# Patient Record
Sex: FEMALE | Race: WHITE | Employment: FULL TIME | URBAN - METROPOLITAN AREA
[De-identification: names, ages, dates, MRNs, and addresses within clinical notes are randomized per-mention and may not be internally consistent; named-entity substitution may affect disease eponyms.]

---

## 2017-10-04 ENCOUNTER — HOSPITAL ENCOUNTER (EMERGENCY)
Age: 42
Discharge: LEFT BEFORE DISCHARGE | End: 2017-10-04
Attending: EMERGENCY MEDICINE
Payer: SELF-PAY

## 2017-10-04 VITALS
DIASTOLIC BLOOD PRESSURE: 68 MMHG | BODY MASS INDEX: 20.46 KG/M2 | WEIGHT: 135 LBS | OXYGEN SATURATION: 99 % | SYSTOLIC BLOOD PRESSURE: 120 MMHG | TEMPERATURE: 98.3 F | HEIGHT: 68 IN | HEART RATE: 87 BPM | RESPIRATION RATE: 16 BRPM

## 2017-10-04 LAB
APPEARANCE UR: CLEAR
BACTERIA URNS QL MICRO: ABNORMAL /HPF
BILIRUB UR QL: NEGATIVE
COLOR UR: YELLOW
EPITH CASTS URNS QL MICRO: ABNORMAL /LPF (ref 0–5)
GLUCOSE UR STRIP.AUTO-MCNC: NEGATIVE MG/DL
HGB UR QL STRIP: NEGATIVE
KETONES UR QL STRIP.AUTO: NEGATIVE MG/DL
LEUKOCYTE ESTERASE UR QL STRIP.AUTO: ABNORMAL
NITRITE UR QL STRIP.AUTO: NEGATIVE
PH UR STRIP: 6.5 [PH] (ref 5–8)
PROT UR STRIP-MCNC: NEGATIVE MG/DL
RBC #/AREA URNS HPF: ABNORMAL /HPF (ref 0–5)
SP GR UR REFRACTOMETRY: >1.03 (ref 1–1.03)
UROBILINOGEN UR QL STRIP.AUTO: 1 EU/DL (ref 0.2–1)
WBC URNS QL MICRO: ABNORMAL /HPF (ref 0–5)

## 2017-10-04 PROCEDURE — 75810000275 HC EMERGENCY DEPT VISIT NO LEVEL OF CARE

## 2017-10-04 PROCEDURE — 81001 URINALYSIS AUTO W/SCOPE: CPT | Performed by: EMERGENCY MEDICINE

## 2017-10-05 NOTE — CALL BACK NOTE
Patient was called this AM as she left yesterday without being seen due to the wait time. Patient requesting to know the results of her urine sample. The patient was updated on results. Patients abdominal pain has improved but she is still experiencing urinary urgency. Patient was encouraged to return to the ED for further evaluation or follow up with PCP. Patient offering no questions or concerns at this time.

## 2017-12-10 ENCOUNTER — HOSPITAL ENCOUNTER (EMERGENCY)
Age: 42
Discharge: HOME | End: 2017-12-10
Attending: EMERGENCY MEDICINE
Payer: COMMERCIAL

## 2017-12-10 ENCOUNTER — APPOINTMENT (OUTPATIENT)
Dept: CT IMAGING | Age: 42
End: 2017-12-10
Attending: EMERGENCY MEDICINE
Payer: COMMERCIAL

## 2017-12-10 ENCOUNTER — APPOINTMENT (OUTPATIENT)
Dept: GENERAL RADIOLOGY | Age: 42
End: 2017-12-10
Attending: EMERGENCY MEDICINE
Payer: COMMERCIAL

## 2017-12-10 VITALS
HEIGHT: 68 IN | SYSTOLIC BLOOD PRESSURE: 149 MMHG | RESPIRATION RATE: 16 BRPM | TEMPERATURE: 98.9 F | WEIGHT: 180 LBS | OXYGEN SATURATION: 100 % | HEART RATE: 101 BPM | BODY MASS INDEX: 27.28 KG/M2 | DIASTOLIC BLOOD PRESSURE: 100 MMHG

## 2017-12-10 DIAGNOSIS — S56.912A STRAIN OF UNSPECIFIED MUSCLES, FASCIA AND TENDONS AT FOREARM LEVEL, LEFT ARM, INITIAL ENCOUNTER: ICD-10-CM

## 2017-12-10 DIAGNOSIS — Z53.29 REFUSAL OF CARE BY PATIENT: ICD-10-CM

## 2017-12-10 DIAGNOSIS — S43.402A SPRAIN OF LEFT SHOULDER, INITIAL ENCOUNTER: ICD-10-CM

## 2017-12-10 DIAGNOSIS — S60.00XA CONTUSION OF LEFT HAND INCLUDING FINGERS, INITIAL ENCOUNTER: ICD-10-CM

## 2017-12-10 DIAGNOSIS — S16.1XXA CERVICAL STRAIN, ACUTE, INITIAL ENCOUNTER: ICD-10-CM

## 2017-12-10 DIAGNOSIS — S00.83XA FACIAL CONTUSION, INITIAL ENCOUNTER: Primary | ICD-10-CM

## 2017-12-10 DIAGNOSIS — S60.222A CONTUSION OF LEFT HAND INCLUDING FINGERS, INITIAL ENCOUNTER: ICD-10-CM

## 2017-12-10 DIAGNOSIS — S12.500A CLOSED DISPLACED FRACTURE OF SIXTH CERVICAL VERTEBRA, UNSPECIFIED FRACTURE MORPHOLOGY, INITIAL ENCOUNTER (HCC): ICD-10-CM

## 2017-12-10 PROCEDURE — 73090 X-RAY EXAM OF FOREARM: CPT

## 2017-12-10 PROCEDURE — 73130 X-RAY EXAM OF HAND: CPT

## 2017-12-10 PROCEDURE — 73060 X-RAY EXAM OF HUMERUS: CPT

## 2017-12-10 PROCEDURE — 74011250637 HC RX REV CODE- 250/637: Performed by: EMERGENCY MEDICINE

## 2017-12-10 PROCEDURE — 99282 EMERGENCY DEPT VISIT SF MDM: CPT

## 2017-12-10 PROCEDURE — 73030 X-RAY EXAM OF SHOULDER: CPT

## 2017-12-10 PROCEDURE — 72125 CT NECK SPINE W/O DYE: CPT

## 2017-12-10 PROCEDURE — L0172 CERV COL SR FOAM 2PC PRE OTS: HCPCS

## 2017-12-10 PROCEDURE — 72040 X-RAY EXAM NECK SPINE 2-3 VW: CPT

## 2017-12-10 PROCEDURE — 71020 XR CHEST PA LAT: CPT

## 2017-12-10 PROCEDURE — L0160 CERV SR WIRE OCC/MAN PRE OTS: HCPCS

## 2017-12-10 RX ORDER — IBUPROFEN 800 MG/1
800 TABLET ORAL
Qty: 15 TAB | Refills: 0 | Status: SHIPPED | OUTPATIENT
Start: 2017-12-10 | End: 2017-12-17

## 2017-12-10 RX ORDER — IBUPROFEN 600 MG/1
600 TABLET ORAL
Status: COMPLETED | OUTPATIENT
Start: 2017-12-10 | End: 2017-12-10

## 2017-12-10 RX ORDER — CYCLOBENZAPRINE HCL 10 MG
10 TABLET ORAL
Qty: 15 TAB | Refills: 0 | Status: SHIPPED | OUTPATIENT
Start: 2017-12-10 | End: 2017-12-15

## 2017-12-10 RX ORDER — HYDROCODONE BITARTRATE AND ACETAMINOPHEN 5; 325 MG/1; MG/1
1 TABLET ORAL
Qty: 12 TAB | Refills: 0 | Status: SHIPPED | OUTPATIENT
Start: 2017-12-10 | End: 2017-12-13

## 2017-12-10 RX ADMIN — IBUPROFEN 600 MG: 600 TABLET, FILM COATED ORAL at 14:02

## 2017-12-10 NOTE — ED NOTES
Pt out to desk multiple times to speak with Dr Prema Young;  Pt states that she needs to leave, states her ride is here, does not want to wait for any further testing or for instructions regarding treatment and follow up;  See notes by Dr Prema Young.

## 2017-12-10 NOTE — ED NOTES
Pt left before receiving discharge instructions or follow up information; pt stated to Dr Torin Frausto that she would call back and speak with him, stated she had to leave because her ride was leaving and she would have no other way to get home;
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
## 2017-12-10 NOTE — ED NOTES
Pt left before receiving discharge instructions or prescriptions  Spoke with Dr Tiara Armstrong via telephone;  Pt verbalized that she will return to speak with Dr Tiara Armstrong and receiving prescriptions and instructions;
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
## 2017-12-10 NOTE — LETTER
[facility] [place] 
THE [facility] EMERGENCY DEPT 
[address] [address] 
[phone] Work/School Note Date: 12/10/2017 To Whom It May concern: 
 
Victorino Barrientos was seen and treated today in the emergency room by the following provider(s): 
No providers found.    
 
Victorino Barrientos may not return to work until cleared by the orthopedic specialist. 
 
Sincerely, 
 
 
 
 
Victor Hugo Montejo MD

## 2017-12-11 NOTE — ED NOTES
Pt returned to the ED to receive discharge instructions and prescriptions from Dr Truman Mart;  Joaquín Chapman 3 in family;  [place] collar previously placed on patient was not being worn by patient; Pt states \"it's not comfortable\"  Pt again instructed about the importance of wearing the collar, advised by Dr Truman Mart that the orthopedist/spine specialist recommended the collar be worn, and also explained to patient her injury and how the fracture is possibly causing the radicular pain by Dr Truman Mart;  Pt again verbalized understanding and states \"I will try to wear the collor, I will do my best\"  Pt discharged by Dr Truman Mart and follow up instructions reiterated by Dr Truman Mart repeatedly; pt again verbalized her understanding.

## 2018-03-02 PROBLEM — M40.202 CERVICAL KYPHOSIS: Status: ACTIVE | Noted: 2018-03-02

## 2018-03-02 PROBLEM — M50.30 DDD (DEGENERATIVE DISC DISEASE), CERVICAL: Status: ACTIVE | Noted: 2018-03-02

## 2018-03-02 PROBLEM — S12.500A C6 CERVICAL FRACTURE (HCC): Status: ACTIVE | Noted: 2018-03-02

## 2018-04-25 ENCOUNTER — APPOINTMENT (OUTPATIENT)
Dept: CT IMAGING | Age: 43
End: 2018-04-25
Attending: INTERNAL MEDICINE
Payer: COMMERCIAL

## 2018-04-25 ENCOUNTER — HOSPITAL ENCOUNTER (EMERGENCY)
Age: 43
Discharge: HOME | End: 2018-04-25
Attending: INTERNAL MEDICINE | Admitting: INTERNAL MEDICINE
Payer: COMMERCIAL

## 2018-04-25 VITALS
DIASTOLIC BLOOD PRESSURE: 78 MMHG | BODY MASS INDEX: 28.04 KG/M2 | HEART RATE: 86 BPM | OXYGEN SATURATION: 100 % | RESPIRATION RATE: 18 BRPM | WEIGHT: 185 LBS | HEIGHT: 68 IN | SYSTOLIC BLOOD PRESSURE: 128 MMHG | TEMPERATURE: 98.2 F

## 2018-04-25 DIAGNOSIS — N20.1 URETERAL STONE: ICD-10-CM

## 2018-04-25 DIAGNOSIS — R10.9 FLANK PAIN: Primary | ICD-10-CM

## 2018-04-25 DIAGNOSIS — K80.50 CALCULUS OF BILE DUCT WITHOUT CHOLANGITIS OR BILIARY OBSTRUCTION: ICD-10-CM

## 2018-04-25 LAB
ALBUMIN SERPL-MCNC: 3.7 G/DL (ref 3.4–5)
ALBUMIN/GLOB SERPL: 1.1 {RATIO} (ref 0.8–1.7)
ALP SERPL-CCNC: 61 U/L (ref 45–117)
ALT SERPL-CCNC: 33 U/L (ref 13–56)
ANION GAP SERPL CALC-SCNC: 5 MMOL/L (ref 3–18)
APPEARANCE UR: CLEAR
AST SERPL-CCNC: 19 U/L (ref 15–37)
BACTERIA URNS QL MICRO: ABNORMAL /HPF
BASOPHILS # BLD: 0 K/UL (ref 0–0.06)
BASOPHILS NFR BLD: 1 % (ref 0–2)
BILIRUB SERPL-MCNC: 0.5 MG/DL (ref 0.2–1)
BILIRUB UR QL: NEGATIVE
BUN SERPL-MCNC: 14 MG/DL (ref 7–18)
BUN/CREAT SERPL: 15 (ref 12–20)
CALCIUM SERPL-MCNC: 8.8 MG/DL (ref 8.5–10.1)
CHLORIDE SERPL-SCNC: 104 MMOL/L (ref 100–108)
CO2 SERPL-SCNC: 27 MMOL/L (ref 21–32)
COLOR UR: YELLOW
CREAT SERPL-MCNC: 0.91 MG/DL (ref 0.6–1.3)
DIFFERENTIAL METHOD BLD: ABNORMAL
EOSINOPHIL # BLD: 0.2 K/UL (ref 0–0.4)
EOSINOPHIL NFR BLD: 3 % (ref 0–5)
EPITH CASTS URNS QL MICRO: ABNORMAL /LPF (ref 0–5)
ERYTHROCYTE [DISTWIDTH] IN BLOOD BY AUTOMATED COUNT: 12.2 % (ref 11.6–14.5)
GLOBULIN SER CALC-MCNC: 3.4 G/DL (ref 2–4)
GLUCOSE SERPL-MCNC: 104 MG/DL (ref 74–99)
GLUCOSE UR STRIP.AUTO-MCNC: NEGATIVE MG/DL
HCG UR QL: NEGATIVE
HCT VFR BLD AUTO: 41.4 % (ref 35–45)
HGB BLD-MCNC: 14 G/DL (ref 12–16)
HGB UR QL STRIP: NEGATIVE
HYALINE CASTS URNS QL MICRO: ABNORMAL /LPF (ref 0–2)
KETONES UR QL STRIP.AUTO: NEGATIVE MG/DL
LEUKOCYTE ESTERASE UR QL STRIP.AUTO: ABNORMAL
LYMPHOCYTES # BLD: 2.2 K/UL (ref 0.9–3.6)
LYMPHOCYTES NFR BLD: 28 % (ref 21–52)
MCH RBC QN AUTO: 33.1 PG (ref 24–34)
MCHC RBC AUTO-ENTMCNC: 33.8 G/DL (ref 31–37)
MCV RBC AUTO: 97.9 FL (ref 74–97)
MONOCYTES # BLD: 0.6 K/UL (ref 0.05–1.2)
MONOCYTES NFR BLD: 8 % (ref 3–10)
NEUTS SEG # BLD: 4.9 K/UL (ref 1.8–8)
NEUTS SEG NFR BLD: 60 % (ref 40–73)
NITRITE UR QL STRIP.AUTO: NEGATIVE
PH UR STRIP: 6 [PH] (ref 5–8)
PLATELET # BLD AUTO: 235 K/UL (ref 135–420)
PMV BLD AUTO: 9.5 FL (ref 9.2–11.8)
POTASSIUM SERPL-SCNC: 3.7 MMOL/L (ref 3.5–5.5)
PROT SERPL-MCNC: 7.1 G/DL (ref 6.4–8.2)
PROT UR STRIP-MCNC: NEGATIVE MG/DL
RBC # BLD AUTO: 4.23 M/UL (ref 4.2–5.3)
RBC #/AREA URNS HPF: ABNORMAL /HPF (ref 0–5)
SODIUM SERPL-SCNC: 136 MMOL/L (ref 136–145)
SP GR UR REFRACTOMETRY: 1.02 (ref 1–1.03)
UROBILINOGEN UR QL STRIP.AUTO: 0.2 EU/DL (ref 0.2–1)
WBC # BLD AUTO: 8 K/UL (ref 4.6–13.2)
WBC URNS QL MICRO: ABNORMAL /HPF (ref 0–5)

## 2018-04-25 PROCEDURE — 87086 URINE CULTURE/COLONY COUNT: CPT | Performed by: INTERNAL MEDICINE

## 2018-04-25 PROCEDURE — 81001 URINALYSIS AUTO W/SCOPE: CPT | Performed by: INTERNAL MEDICINE

## 2018-04-25 PROCEDURE — 81025 URINE PREGNANCY TEST: CPT

## 2018-04-25 PROCEDURE — 87186 SC STD MICRODIL/AGAR DIL: CPT | Performed by: INTERNAL MEDICINE

## 2018-04-25 PROCEDURE — 85025 COMPLETE CBC W/AUTO DIFF WBC: CPT | Performed by: INTERNAL MEDICINE

## 2018-04-25 PROCEDURE — 87077 CULTURE AEROBIC IDENTIFY: CPT | Performed by: INTERNAL MEDICINE

## 2018-04-25 PROCEDURE — 99284 EMERGENCY DEPT VISIT MOD MDM: CPT

## 2018-04-25 PROCEDURE — 74176 CT ABD & PELVIS W/O CONTRAST: CPT

## 2018-04-25 PROCEDURE — 74011250636 HC RX REV CODE- 250/636: Performed by: INTERNAL MEDICINE

## 2018-04-25 PROCEDURE — 80053 COMPREHEN METABOLIC PANEL: CPT | Performed by: INTERNAL MEDICINE

## 2018-04-25 PROCEDURE — 96374 THER/PROPH/DIAG INJ IV PUSH: CPT

## 2018-04-25 PROCEDURE — 96361 HYDRATE IV INFUSION ADD-ON: CPT

## 2018-04-25 RX ORDER — NITROFURANTOIN (MACROCRYSTALS) 100 MG/1
100 CAPSULE ORAL 2 TIMES DAILY
Qty: 14 CAP | Refills: 0 | Status: SHIPPED | OUTPATIENT
Start: 2018-04-25 | End: 2018-05-02

## 2018-04-25 RX ORDER — IBUPROFEN 600 MG/1
600 TABLET ORAL
Qty: 20 TAB | Refills: 0 | Status: SHIPPED | OUTPATIENT
Start: 2018-04-25

## 2018-04-25 RX ORDER — KETOROLAC TROMETHAMINE 30 MG/ML
30 INJECTION, SOLUTION INTRAMUSCULAR; INTRAVENOUS
Status: COMPLETED | OUTPATIENT
Start: 2018-04-25 | End: 2018-04-25

## 2018-04-25 RX ORDER — ACETAMINOPHEN AND CODEINE PHOSPHATE 300; 30 MG/1; MG/1
1 TABLET ORAL
Qty: 12 TAB | Refills: 0 | Status: SHIPPED | OUTPATIENT
Start: 2018-04-25 | End: 2018-10-17

## 2018-04-25 RX ADMIN — SODIUM CHLORIDE 1000 ML: 900 INJECTION, SOLUTION INTRAVENOUS at 08:25

## 2018-04-25 RX ADMIN — KETOROLAC TROMETHAMINE 30 MG: 30 INJECTION, SOLUTION INTRAMUSCULAR at 08:48

## 2018-04-25 NOTE — ED TRIAGE NOTES
Patient with c/o RIGHT flank pain, dysuria, hematuria. Patient was seen for same complaint on Monday at a clinic in [facility]. Reports KUB done. Patient reports passing kidney stone with strainer. Patient woke up this AM with \"stabbing\" pain. Patient reports taking Tylenol and ibuprofen, last admin PTA. Sepsis Screening completed    (  )Patient meets SIRS criteria. ( x )Patient does not meet SIRS criteria.       SIRS Criteria is achieved when two or more of the following are present   Temperature < 96.8°F (36°C) or > 100.9°F (38.3°C)   Heart Rate > 90 beats per minute   Respiratory Rate > 20 breaths per minute   WBC count > 12,000 or <4,000 or > 10% bands

## 2018-04-25 NOTE — ED NOTES
Patient tolerated rx Toradol IVP, see [facility]    Discharge instructions reviewed with the patient with opportunity for questions given. The patient verbalized understanding. Patient armband removed and shredded. Patient's VSS and in stable condition at time of discharge.

## 2018-04-25 NOTE — DISCHARGE INSTRUCTIONS

## 2018-04-25 NOTE — LETTER
[facility] [place] 
THE [facility] EMERGENCY DEPT 
[address] [id number] [phone] Work/School Note Date: 4/27/18 To Whom It May concern: 
 
Puja Gabriel Please return call to the ED for results from your visit. Sincerely, Paresh Villatoro NP

## 2018-04-25 NOTE — ED PROVIDER NOTES
EMERGENCY DEPARTMENT HISTORY AND PHYSICAL EXAM    Date: 4/25/2018  Patient Name: Stanford Hodge    History of Presenting Illness     Chief Complaint   Patient presents with    Flank Pain     RIGHT         History Provided By: Patient    Chief Complaint: right flank pain  Duration: this morning, waking pt from sleep   Timing:  Acute and intermittent  Location: right flank radiating to abd and back  Quality: Stabbing  Severity: 2 out of 10 currently, but 10/10 when pain occurs. Associated Symptoms: dysuria, nausea, chills, back pain, abd pain, and hematuria. Additional History (Context):   7:37 AM  Stanford Hodge is a 37 y.o. female with PMHX of migraines who presents to the emergency department C/O intermittent 2 to 10/10 stabbing right flank pain radiating to abdomen onset this morning, awakening pt. Associated sxs include dysuria, nausea, chills, back pain, abd pain, and hematuria. Pt seen for the same complaint 2 days ago (back pain radiating to abd) in a [facility] [place] with a KUB done. Pt has not had a CT yet. Pt reports passing kidney stone with strainer. Pt placed on abx tx and NORCO (developed Hives so stopped taking). Pt took Tylenol and Ibuprofen every 4-6 hours with last dose PTA. Allergies reported to Latex, Amoxicillin, and Hydrocodone. No PSHx. LNMP was 4/1/2017. Pt is a non smoker and an EtOH user. Pt denies vomiting, fever, and any other sxs or complaints. She also completed zpack for uri and uti recently. PCP: Eliz Vitale MD    Current Outpatient Prescriptions   Medication Sig Dispense Refill    topiramate (TOPAMAX) 100 mg tablet Take 100 mg by mouth two (2) times a day. Past History     Past Medical History:  Past Medical History:   Diagnosis Date    Neurological disorder     migraines       Past Surgical History:  History reviewed. No pertinent surgical history. Family History:  History reviewed. No pertinent family history.     Social History:  Social History   Substance Use Topics    Smoking status: Never Smoker    Smokeless tobacco: Never Used    Alcohol use Yes      Comment: \"socially\" \"not every week\"       Allergies: Allergies   Allergen Reactions    Latex Rash    Amoxicillin Hives and Itching    Hydrocodone Other (comments)         Review of Systems   Review of Systems   Constitutional: Positive for chills. Negative for fever. Gastrointestinal: Positive for abdominal pain and nausea. Negative for vomiting. Genitourinary: Positive for dysuria, flank pain and hematuria. Musculoskeletal: Positive for back pain. All other systems reviewed and are negative. Physical Exam     Vitals:    04/25/18 0721   BP: 123/75   Pulse: 86   Resp: 18   Temp: 98.2 °F (36.8 °C)   SpO2: 100%   Weight: 83.9 kg (185 lb)   Height: 5' 8\" (1.727 m)     Physical Exam   Constitutional: She is oriented to person, place, and time. She appears well-developed and well-nourished. She appears distressed. Mild distress due to pain. HENT:   Head: Normocephalic and atraumatic. Nose: Nose normal.   Mouth/Throat: Oropharynx is clear and moist.   Eyes: Conjunctivae and EOM are normal. Right eye exhibits no discharge. Left eye exhibits no discharge. No scleral icterus. Neck: Normal range of motion. Neck supple. No JVD present. No tracheal deviation present. Cardiovascular: Normal rate, regular rhythm, normal heart sounds and intact distal pulses. Pulmonary/Chest: Effort normal and breath sounds normal.   Abdominal: Soft. Bowel sounds are normal. She exhibits no distension. There is no tenderness. Obese. No HSM   Musculoskeletal: Normal range of motion. She exhibits tenderness. Posterior flank tenderness and no CVA tenderness. Neurological: She is alert and oriented to person, place, and time. She has normal reflexes. No focal motor weakness. Skin: Skin is warm and dry. No rash noted. Psychiatric: She has a normal mood and affect.  Her behavior is normal.   Nursing note and vitals reviewed. Diagnostic Study Results     Labs -     Recent Results (from the past 12 hour(s))   URINALYSIS W/ RFLX MICROSCOPIC    Collection Time: 04/25/18  7:25 AM   Result Value Ref Range    Color YELLOW      Appearance CLEAR      Specific gravity 1.018 1.005 - 1.030      pH (UA) 6.0 5.0 - 8.0      Protein NEGATIVE  NEG mg/dL    Glucose NEGATIVE  NEG mg/dL    Ketone NEGATIVE  NEG mg/dL    Bilirubin NEGATIVE  NEG      Blood NEGATIVE  NEG      Urobilinogen 0.2 0.2 - 1.0 EU/dL    Nitrites NEGATIVE  NEG      Leukocyte Esterase MODERATE (A) NEG     URINE MICROSCOPIC ONLY    Collection Time: 04/25/18  7:25 AM   Result Value Ref Range    WBC 20 to 30 0 - 5 /hpf    RBC 0 to 2 0 - 5 /hpf    Epithelial cells 3+ 0 - 5 /lpf    Bacteria 2+ (A) NEG /hpf    Hyaline cast 2 to 5 0 - 2 /lpf   HCG URINE, QL. - POC    Collection Time: 04/25/18  7:36 AM   Result Value Ref Range    Pregnancy test,urine (POC) NEGATIVE  NEG     CBC WITH AUTOMATED DIFF    Collection Time: 04/25/18  8:20 AM   Result Value Ref Range    WBC 8.0 4.6 - 13.2 K/uL    RBC 4.23 4.20 - 5.30 M/uL    HGB 14.0 12.0 - 16.0 g/dL    HCT 41.4 35.0 - 45.0 %    MCV 97.9 (H) 74.0 - 97.0 FL    MCH 33.1 24.0 - 34.0 PG    MCHC 33.8 31.0 - 37.0 g/dL    RDW 12.2 11.6 - 14.5 %    PLATELET 527 160 - 608 K/uL    MPV 9.5 9.2 - 11.8 FL    NEUTROPHILS 60 40 - 73 %    LYMPHOCYTES 28 21 - 52 %    MONOCYTES 8 3 - 10 %    EOSINOPHILS 3 0 - 5 %    BASOPHILS 1 0 - 2 %    ABS. NEUTROPHILS 4.9 1.8 - 8.0 K/UL    ABS. LYMPHOCYTES 2.2 0.9 - 3.6 K/UL    ABS. MONOCYTES 0.6 0.05 - 1.2 K/UL    ABS. EOSINOPHILS 0.2 0.0 - 0.4 K/UL    ABS.  BASOPHILS 0.0 0.0 - 0.06 K/UL    DF AUTOMATED     METABOLIC PANEL, COMPREHENSIVE    Collection Time: 04/25/18  8:20 AM   Result Value Ref Range    Sodium 136 136 - 145 mmol/L    Potassium 3.7 3.5 - 5.5 mmol/L    Chloride 104 100 - 108 mmol/L    CO2 27 21 - 32 mmol/L    Anion gap 5 3.0 - 18 mmol/L    Glucose 104 (H) 74 - 99 mg/dL    BUN 14 7.0 - 18 MG/DL Creatinine 0.91 0.6 - 1.3 MG/DL    BUN/Creatinine ratio 15 12 - 20      GFR est AA >60 >60 ml/min/1.73m2    GFR est non-AA >60 >60 ml/min/1.73m2    Calcium 8.8 8.5 - 10.1 MG/DL    Bilirubin, total PENDING MG/DL    ALT (SGPT) PENDING U/L    AST (SGOT) PENDING U/L    Alk. phosphatase PENDING U/L    Protein, total PENDING g/dL    Albumin PENDING g/dL    Globulin PENDING g/dL    A-G Ratio PENDING         Radiologic Studies -   CT ABD PELV WO CONT   Final Result        CT Results  (Last 48 hours)               04/25/18 0801  CT ABD PELV WO CONT Final result    Impression:  IMPRESSION:       1. Punctate radiodensities in the distal right ureter, possibly reflective of   small distal ureteral stones. No evidence of hydronephrosis or nephrolithiasis. 2. Normal caliber small and large intestine, to include a normal appendix. 3. Cholelithiasis. Narrative:  EXAM: CT of the abdomen and pelvis       INDICATION: Right-sided flank pain and hematuria. COMPARISON: None. TECHNIQUE: Axial CT imaging of the abdomen and pelvis was performed without oral   or intravenous contrast. Multiplanar reformats were generated. One or more dose reduction techniques were used on this CT: automated exposure   control, adjustment of the mAs and/or kVp according to patient's size, and   iterative reconstruction techniques. The specific techniques utilized on this CT   exam have been documented in the patient's electronic medical record.        _______________       FINDINGS:       LOWER CHEST: Unremarkable. LIVER, BILIARY: Unenhanced appearance of the liver demonstrates no focal   abnormality. No biliary dilation. Small radiopaque gallstone present dependently   within the gallbladder neck. PANCREAS: Normal.       SPLEEN: Normal.       ADRENALS: Normal.       KIDNEYS/URETERS/BLADDER: No hydronephrosis present involving either kidney. No   nephrolithiasis demonstrated.  In the distal right ureter (series 2, images 105   and 106) punctate radiodensities are noted measuring on the order of a   millimeter in size. No left-sided ureteral stone noted. Small amount of gas is   present within the urinary bladder, presumptively from urine sampling. PELVIC ORGANS: Intrauterine contraceptive device present in expected position. Adnexa normal in appearance. VASCULATURE: Unremarkable       LYMPH NODES: No enlarged lymph nodes. GASTROINTESTINAL TRACT: The stomach, small intestine, and large intestine are   normal in course and caliber. No bowel obstruction. No free intraperitoneal gas. Normal appendix. BONES: No acute or aggressive osseous abnormalities identified. OTHER: None.       _______________             As read by the radiologist.    CXR Results  (Last 48 hours)    None          Medications given in the ED-  Medications   sodium chloride 0.9 % bolus infusion 1,000 mL (1,000 mL IntraVENous New Bag 4/25/18 0825)         Medical Decision Making   I am the first provider for this patient. I reviewed the vital signs, available nursing notes, past medical history, past surgical history, family history and social history. Vital Signs-Reviewed the patient's vital signs. Pulse Oximetry Analysis - 100% on room air. Records Reviewed: Nursing Notes    Provider Notes (Medical Decision Making): DDx: Kidney stone, UTI, pyelonephritis, colitis, mass, cyst.    Procedures:  Procedures    ED Course:   7:37 AM Initial assessment performed. The patients presenting problems have been discussed, and they are in agreement with the care plan formulated and outlined with them. I have encouraged them to ask questions as they arise throughout their visit. 8:46 AM Pain has increased and pt now agreeable to pain medication. 855 AM  Pt feeling better, ready to go home. No toxic, septic, no distress.      Diagnosis and Disposition       DISCHARGE NOTE:  8:50 AM  Mariana Din  results have been reviewed with her. She has been counseled regarding her diagnosis, treatment, and plan. She verbally conveys understanding and agreement of the signs, symptoms, diagnosis, treatment and prognosis and additionally agrees to follow up as discussed. She also agrees with the care-plan and conveys that all of her questions have been answered. I have also provided discharge instructions for her that include: educational information regarding their diagnosis and treatment, and list of reasons why they would want to return to the ED prior to their follow-up appointment, should her condition change. She has been provided with education for proper emergency department utilization. CLINICAL IMPRESSION:    1. Flank pain    2. Ureteral stone    3. Calculus of bile duct without cholangitis or biliary obstruction        PLAN:  1. D/C Home  2. Current Discharge Medication List        3. Follow-up Information     Follow up With Details Comments [address] [place] [address], MD Schedule an appointment as soon as possible for a visit For Primary Care Follow Up  47-7  [phone]      Celestino Dean MD Schedule an appointment as soon as possible for a visit For Urology Follow Up [address] [place] [address]  Whitney Henderson MD  For General Surgery Follow Up [address] [place] [address] [address] [facility]  [phone]          _______________________________    Attestations: This note is prepared by Angelika Baltazar, acting as Scribe for Ortiz Boogie MD.    Ortiz Boogie MD:  The scribe's documentation has been prepared under my direction and personally reviewed by me in its entirety.   I confirm that the note above accurately reflects all work, treatment, procedures, and medical decision making performed by me.  _______________________________

## 2018-04-25 NOTE — LETTER
[facility] [place] 
[facility] 
[address] [address] 
[phone] Work/School Note Date: 4/25/2018 To Whom It May concern: 
 
Shantel Moore was seen and treated today in the emergency room by the following provider(s): 
Attending Provider: Gato Colin MD.   
 
Shantel Moore may return to work on 4/26/2018. Sincerely, Gato Colin MD

## 2018-04-27 LAB
BACTERIA SPEC CULT: ABNORMAL
SERVICE CMNT-IMP: ABNORMAL

## 2018-04-27 NOTE — CALL BACK NOTE
Since no return of phone call, d/w NP, Rashid Fofana, who will have certified letter sent out today.

## 2018-04-27 NOTE — CALL BACK NOTE
Urine culture + ecoli, pending sensivities. Please note pt says was taking zpack prescribed by her doctor for uri and uti, so she wanted complete the medication. Called the numbers provided in the demographics, including cell, home and spouses cell number. No answer to latter 2, the 1st one said wrong number. Will have letter sent out.

## 2018-10-17 ENCOUNTER — HOSPITAL ENCOUNTER (EMERGENCY)
Age: 43
Discharge: HOME | End: 2018-10-17
Attending: EMERGENCY MEDICINE
Payer: SELF-PAY

## 2018-10-17 VITALS
WEIGHT: 160 LBS | HEART RATE: 86 BPM | OXYGEN SATURATION: 99 % | TEMPERATURE: 97.9 F | BODY MASS INDEX: 24.25 KG/M2 | DIASTOLIC BLOOD PRESSURE: 77 MMHG | SYSTOLIC BLOOD PRESSURE: 137 MMHG | RESPIRATION RATE: 16 BRPM | HEIGHT: 68 IN

## 2018-10-17 DIAGNOSIS — Z76.0 MEDICATION REFILL: Primary | ICD-10-CM

## 2018-10-17 PROCEDURE — 99282 EMERGENCY DEPT VISIT SF MDM: CPT

## 2018-10-17 RX ORDER — FLUOXETINE HYDROCHLORIDE 40 MG/1
40 CAPSULE ORAL 2 TIMES DAILY
Qty: 60 CAP | Refills: 0 | Status: SHIPPED | OUTPATIENT
Start: 2018-10-17 | End: 2018-11-16

## 2018-10-17 RX ORDER — FLUOXETINE 10 MG/1
TABLET ORAL DAILY
COMMUNITY
End: 2018-10-17

## 2018-10-17 NOTE — ED PROVIDER NOTES
EMERGENCY DEPARTMENT HISTORY AND PHYSICAL EXAM 
 
Date: 10/17/2018 Patient Name: Alisia Mann History of Presenting Illness Chief Complaint Patient presents with  Medication Refill History Provided By: Patient Chief Complaint: Medication Refill Duration: 5 Days Timing:  Acute Location: N/A Quality: Prozac 40 mg BID Associated Symptoms: anxiety, tinnitus, and HA Additional History (Context):  
12:00 PM 
Alisia Mann is a 37 y.o. female with PMHX of migraines who presents to the emergency department for medication refill (Prozac 40 mg BID) with last dose 5 days ago. Associated sxs include anxiety, tinnitus. Pt reports she recently moved here and has been unable to obtain a PCP. Pt states she had a \"horrible divorce\" and has been taking Prozac 40 mg BID x past 8 months. PT has made appt with new PCP Gustavo Berman MD on November 17. Pt denies fever, chills, nausea, vomiting, chest pain, numbness, weakness, and any other sxs or complaints. PCP: Anabel Maldonado MD 
 
Current Outpatient Medications Medication Sig Dispense Refill  FLUoxetine (PROZAC) 40 mg capsule Take 1 Cap by mouth two (2) times a day for 30 days. 60 Cap 0  ibuprofen (MOTRIN) 600 mg tablet Take 1 Tab by mouth every eight (8) hours as needed for Pain. 20 Tab 0  
 topiramate (TOPAMAX) 100 mg tablet Take 100 mg by mouth two (2) times a day. Past History Past Medical History: 
Past Medical History:  
Diagnosis Date  Neurological disorder   
 migraines Past Surgical History: No past surgical history on file. Family History: No family history on file. Social History: 
Social History Tobacco Use  Smoking status: Never Smoker  Smokeless tobacco: Never Used Substance Use Topics  Alcohol use: Yes Comment: \"socially\" \"not every week\"  Drug use: No  
 
 
Allergies: Allergies Allergen Reactions  Latex Rash  Amoxicillin Hives and Itching  Hydrocodone Itching  Sulfa (Sulfonamide Antibiotics) Hives Review of Systems Review of Systems Constitutional: Negative for fever. HENT: Positive for tinnitus. Negative for congestion, sinus pressure and sinus pain. Respiratory: Negative for shortness of breath. Cardiovascular: Negative for chest pain. Gastrointestinal: Negative for nausea and vomiting. Neurological: Negative for dizziness, syncope and light-headedness. (+) Facial tingling Psychiatric/Behavioral: The patient is nervous/anxious. All other systems reviewed and are negative. Physical Exam  
 
Vitals:  
 10/17/18 1146 BP: 137/77 Pulse: 86 Resp: 16 Temp: 97.9 °F (36.6 °C) SpO2: 99% Weight: 72.6 kg (160 lb) Height: 5' 8\" (1.727 m) Physical Exam  
Constitutional: She is oriented to person, place, and time. She appears well-developed and well-nourished. No distress.  female in NAD. Alert. Appears comfortable. In fast track room HENT:  
Head: Normocephalic and atraumatic. Right Ear: External ear normal. No swelling or tenderness. Tympanic membrane is not perforated, not erythematous and not bulging. Left Ear: External ear normal. No swelling or tenderness. Tympanic membrane is not perforated, not erythematous and not bulging. Nose: Nose normal. No mucosal edema or rhinorrhea. Mouth/Throat: Uvula is midline, oropharynx is clear and moist and mucous membranes are normal. No oral lesions. No trismus in the jaw. No dental abscesses or uvula swelling. No oropharyngeal exudate, posterior oropharyngeal edema, posterior oropharyngeal erythema or tonsillar abscesses. Eyes: Conjunctivae are normal. Right eye exhibits no discharge. Left eye exhibits no discharge. No scleral icterus. Neck: Normal range of motion. Cardiovascular: Normal rate, regular rhythm, normal heart sounds and intact distal pulses. Exam reveals no gallop and no friction rub. No murmur heard. Pulmonary/Chest: Effort normal and breath sounds normal. No accessory muscle usage. No tachypnea. She has no decreased breath sounds. She has no rhonchi. Neurological: She is alert and oriented to person, place, and time. Skin: Skin is warm and dry. She is not diaphoretic. Psychiatric: Judgment normal. Her mood appears anxious. Nursing note and vitals reviewed. Diagnostic Study Results Labs - No results found for this or any previous visit (from the past 12 hour(s)). Radiologic Studies - No orders to display CT Results  (Last 48 hours) None CXR Results  (Last 48 hours) None Medications given in the ED- Medications - No data to display Medical Decision Making I am the first provider for this patient. I reviewed the vital signs, available nursing notes, past medical history, past surgical history, family history and social history. Vital Signs-Reviewed the patient's vital signs. Pulse Oximetry Analysis - 99% on RA Records Reviewed: Nursing Notes and Old Medical Records Provider Notes (Medical Decision Making): medication refill. A&O Procedures: 
Procedures ED Course:  
12:00 PM Initial assessment performed. The patients presenting problems have been discussed, and they are in agreement with the care plan formulated and outlined with them. I have encouraged them to ask questions as they arise throughout their visit. Diagnosis and Disposition Will refill prozac. Has appt November 17 with new PCP. Reasons to RTED discussed with pt. All questions answered. Pt feels comfortable going home at this time. Pt expressed understanding and she agrees with plan. DISCHARGE NOTE: 
12:07 PM 
Rahul Stout  results have been reviewed with her. She has been counseled regarding her diagnosis, treatment, and plan.   She verbally conveys understanding and agreement of the signs, symptoms, diagnosis, treatment and prognosis and additionally agrees to follow up as discussed. She also agrees with the care-plan and conveys that all of her questions have been answered. I have also provided discharge instructions for her that include: educational information regarding their diagnosis and treatment, and list of reasons why they would want to return to the ED prior to their follow-up appointment, should her condition change. She has been provided with education for proper emergency department utilization. CLINICAL IMPRESSION: 
 
1. Medication refill PLAN: 
1. D/C Home 2. Discharge Medication List as of 10/17/2018 12:11 PM  
  
START taking these medications Details FLUoxetine (PROZAC) 40 mg capsule Take 1 Cap by mouth two (2) times a day for 30 days. , Normal, Disp-60 Cap, R-0  
  
  
CONTINUE these medications which have NOT CHANGED Details  
ibuprofen (MOTRIN) 600 mg tablet Take 1 Tab by mouth every eight (8) hours as needed for Pain., Normal, Disp-20 Tab, R-0  
  
topiramate (TOPAMAX) 100 mg tablet Take 100 mg by mouth two (2) times a day. Historical Med, 100 mg  
  
  
STOP taking these medications FLUoxetine (PROZAC) 10 mg tablet Comments:  
Reason for Stopping:   
   
 acetaminophen-codeine (TYLENOL-CODEINE #3) 300-30 mg per tablet Comments:  
Reason for Stopping: 3.  
Follow-up Information Follow up With Specialties Details Why Contact Info Nicole Perez MD Family Practice Go to Go to appointment as scheduled for primary care follow up. [address] [place] [address] Fix [id number] [phone] [facility] EMERGENCY DEPT Emergency Medicine Go to As needed, If symptoms worsen [address] [address] 
[phone]  
  
 
_______________________________ Attestations: This note is prepared by Hans Wadsworth, acting as Scribe for Micheal Banks PA-C.  
 
Micheal Banks PA-C:  The scribe's documentation has been prepared under my direction and personally reviewed by me in its entirety. I confirm that the note above accurately reflects all work, treatment, procedures, and medical decision making performed by me. 
_______________________________

## 2018-10-17 NOTE — DISCHARGE INSTRUCTIONS
Medication Refill: Care Instructions  Your Care Instructions    Medicines are a big part of treatment for many health problems. So it can be upsetting to run out of your medicine. It may even be dangerous to stop a medicine suddenly. The doctor may have given you enough medicine to help you until you can see your regular doctor. The doctor has checked you carefully, but problems can develop later. If you notice any problems or new symptoms, get medical treatment right away. Follow-up care is a key part of your treatment and safety. Be sure to make and go to all appointments, and call your doctor if you are having problems. It's also a good idea to know your test results and keep a list of the medicines you take. How can you care for yourself at home? · Be safe with medicines. Take your medicines exactly as prescribed. · Know when you will run out of your medicine. Use a calendar to remind you to get a refill. Don't wait until you have only a few pills left. · Talk with your doctor or pharmacist about your medicine. Find out what to do if you miss a dose. When should you call for help? Call your doctor now or seek immediate medical care if:    · You have problems with your medicine.    Watch closely for changes in your health, and be sure to contact your doctor if you have any problems. Where can you learn more? Go to http://thierry-rl.info/. Enter K326 in the search box to learn more about \"Medication Refill: Care Instructions. \"  Current as of: March 29, 2018  Content Version: 11.8  © 9558-9282 [employer]. Care instructions adapted under license by [employer] (which disclaims liability or warranty for this information). If you have questions about a medical condition or this instruction, always ask your healthcare professional. [address] any warranty or liability for your use of this information.

## 2022-11-02 NOTE — ED TRIAGE NOTES
Patient new to area and requesting medication refill for Prozac, patient reports headache and ears ringing, patient states she hasn't taken medication in 5 days.  Patient a.o x3 patient in NAD 
 Bactrim Pregnancy And Lactation Text: This medication is Pregnancy Category D and is known to cause fetal risk.  It is also excreted in breast milk.